# Patient Record
Sex: MALE | Race: WHITE
[De-identification: names, ages, dates, MRNs, and addresses within clinical notes are randomized per-mention and may not be internally consistent; named-entity substitution may affect disease eponyms.]

---

## 2023-11-21 ENCOUNTER — HOSPITAL ENCOUNTER (INPATIENT)
Age: 73
LOS: 2 days | Discharge: ANOTHER HOSPITAL | DRG: 682 | End: 2023-11-23
Payer: MEDICARE

## 2023-11-21 VITALS
RESPIRATION RATE: 20 BRPM | TEMPERATURE: 97.9 F | DIASTOLIC BLOOD PRESSURE: 113 MMHG | OXYGEN SATURATION: 95 % | HEART RATE: 84 BPM | SYSTOLIC BLOOD PRESSURE: 183 MMHG

## 2023-11-21 VITALS
HEART RATE: 83 BPM | DIASTOLIC BLOOD PRESSURE: 70 MMHG | RESPIRATION RATE: 16 BRPM | TEMPERATURE: 98.42 F | SYSTOLIC BLOOD PRESSURE: 114 MMHG | OXYGEN SATURATION: 97 %

## 2023-11-21 VITALS — BODY MASS INDEX: 34.7 KG/M2 | BODY MASS INDEX: 34.6 KG/M2

## 2023-11-21 VITALS
HEART RATE: 86 BPM | DIASTOLIC BLOOD PRESSURE: 78 MMHG | SYSTOLIC BLOOD PRESSURE: 146 MMHG | OXYGEN SATURATION: 93 % | TEMPERATURE: 97.7 F | RESPIRATION RATE: 17 BRPM

## 2023-11-21 VITALS — SYSTOLIC BLOOD PRESSURE: 167 MMHG | DIASTOLIC BLOOD PRESSURE: 97 MMHG | HEART RATE: 81 BPM

## 2023-11-21 DIAGNOSIS — I25.10: ICD-10-CM

## 2023-11-21 DIAGNOSIS — M19.90: ICD-10-CM

## 2023-11-21 DIAGNOSIS — E78.2: ICD-10-CM

## 2023-11-21 DIAGNOSIS — I50.21: ICD-10-CM

## 2023-11-21 DIAGNOSIS — N18.2: ICD-10-CM

## 2023-11-21 DIAGNOSIS — I13.10: ICD-10-CM

## 2023-11-21 DIAGNOSIS — E66.9: ICD-10-CM

## 2023-11-21 DIAGNOSIS — E83.42: ICD-10-CM

## 2023-11-21 DIAGNOSIS — I42.9: ICD-10-CM

## 2023-11-21 LAB
ALBUMIN LEVEL: 4.4 G/DL (ref 3.5–5)
ALBUMIN/GLOB SERPL: 1.5 {RATIO} (ref 1.1–1.8)
ALP ISO SERPL-ACNC: 95 U/L (ref 38–126)
ALT SERPLBLD-CCNC: 35 U/L (ref 12–78)
ANION GAP SERPL CALC-SCNC: 14.1 MEQ/L (ref 5–15)
AST SERPL QL: 53 U/L (ref 17–59)
BILIRUBIN,TOTAL: 1.3 MG/DL (ref 0.2–1.3)
BUN SERPL-MCNC: 28 MG/DL (ref 9–20)
CALCIUM SPEC-MCNC: 8.6 MG/DL (ref 8.4–10.2)
CHLORIDE SPEC-SCNC: 103 MMOL/L (ref 98–107)
CO2 SERPL-SCNC: 24 MMOL/L (ref 22–30)
CREAT BLD-SCNC: 1.4 MG/DL (ref 0.66–1.25)
CREATININE CLEARANCE ESTIMATED: 66 ML/MIN (ref 50–200)
ESTIMATED GLOMERULAR FILT RATE: 50 ML/MIN (ref 60–?)
GFR (AFRICAN AMERICAN): 60 ML/MIN (ref 60–?)
GLOBULIN SER CALC-MCNC: 3 G/DL (ref 1.3–3.2)
GLUCOSE: 111 MG/DL (ref 74–100)
HBA1C MFR BLD: 5.6 % (ref 4–6)
HCT VFR BLD CALC: 41.8 % (ref 42–52)
HGB BLD-MCNC: 14.5 G/DL (ref 14.1–18)
MAGNESIUM: 1.4 MG/DL (ref 1.6–2.3)
MCHC RBC-ENTMCNC: 34.6 G/DL (ref 31.8–35.4)
MCV RBC: 91.4 FL (ref 80–94)
MEAN CORPUSCULAR HEMOGLOBIN: 31.6 PG (ref 27–31.2)
NT PRO BRAIN NATRIURETIC PEP.: 5940 PG/ML (ref 0–125)
PLATELET # BLD: 209 K/MM3 (ref 142–424)
POTASSIUM: 3.1 MMOL/L (ref 3.5–5.1)
PROT SERPL-MCNC: 7.4 G/DL (ref 6.3–8.2)
RBC # BLD AUTO: 4.57 M/MM3 (ref 4.6–6.2)
SODIUM SPEC-SCNC: 138 MMOL/L (ref 136–145)
TROPONIN I: 0.03 NG/ML (ref 0–0.03)
TROPONIN I: 0.03 NG/ML (ref 0–0.03)
TSH SERPL-ACNC: 0.73 UIU/ML (ref 0.47–4.68)
WBC # BLD AUTO: 7.8 K/MM3 (ref 4.8–10.8)

## 2023-11-21 PROCEDURE — 85025 COMPLETE CBC W/AUTO DIFF WBC: CPT

## 2023-11-21 PROCEDURE — 80053 COMPREHEN METABOLIC PANEL: CPT

## 2023-11-21 PROCEDURE — C1769 GUIDE WIRE: HCPCS

## 2023-11-21 PROCEDURE — 83036 HEMOGLOBIN GLYCOSYLATED A1C: CPT

## 2023-11-21 PROCEDURE — 83735 ASSAY OF MAGNESIUM: CPT

## 2023-11-21 PROCEDURE — 93458 L HRT ARTERY/VENTRICLE ANGIO: CPT

## 2023-11-21 PROCEDURE — 80061 LIPID PANEL: CPT

## 2023-11-21 PROCEDURE — 93308 TTE F-UP OR LMTD: CPT

## 2023-11-21 PROCEDURE — 93005 ELECTROCARDIOGRAM TRACING: CPT

## 2023-11-21 PROCEDURE — 36415 COLL VENOUS BLD VENIPUNCTURE: CPT

## 2023-11-21 PROCEDURE — 99152 MOD SED SAME PHYS/QHP 5/>YRS: CPT

## 2023-11-21 PROCEDURE — C1725 CATH, TRANSLUMIN NON-LASER: HCPCS

## 2023-11-21 PROCEDURE — 84484 ASSAY OF TROPONIN QUANT: CPT

## 2023-11-21 PROCEDURE — 84443 ASSAY THYROID STIM HORMONE: CPT

## 2023-11-21 PROCEDURE — 93452 LEFT HRT CATH W/VENTRCLGRPHY: CPT

## 2023-11-21 PROCEDURE — 83880 ASSAY OF NATRIURETIC PEPTIDE: CPT

## 2023-11-21 NOTE — PC.NURSE
received report from basil at [facility], pt arrived by personal vehicle with wife. arrived to floor at 1610 via wheelchair

## 2023-11-22 VITALS
HEART RATE: 70 BPM | DIASTOLIC BLOOD PRESSURE: 84 MMHG | SYSTOLIC BLOOD PRESSURE: 111 MMHG | RESPIRATION RATE: 16 BRPM | TEMPERATURE: 98.5 F | OXYGEN SATURATION: 92 %

## 2023-11-22 VITALS
RESPIRATION RATE: 16 BRPM | SYSTOLIC BLOOD PRESSURE: 102 MMHG | TEMPERATURE: 98.5 F | HEART RATE: 77 BPM | OXYGEN SATURATION: 92 % | DIASTOLIC BLOOD PRESSURE: 63 MMHG

## 2023-11-22 VITALS
RESPIRATION RATE: 18 BRPM | HEART RATE: 73 BPM | SYSTOLIC BLOOD PRESSURE: 110 MMHG | OXYGEN SATURATION: 90 % | DIASTOLIC BLOOD PRESSURE: 78 MMHG

## 2023-11-22 VITALS
OXYGEN SATURATION: 96 % | SYSTOLIC BLOOD PRESSURE: 113 MMHG | DIASTOLIC BLOOD PRESSURE: 70 MMHG | HEART RATE: 78 BPM | TEMPERATURE: 98.42 F | RESPIRATION RATE: 16 BRPM

## 2023-11-22 VITALS
DIASTOLIC BLOOD PRESSURE: 78 MMHG | HEART RATE: 72 BPM | SYSTOLIC BLOOD PRESSURE: 137 MMHG | RESPIRATION RATE: 16 BRPM | OXYGEN SATURATION: 96 % | TEMPERATURE: 98.42 F

## 2023-11-22 VITALS
DIASTOLIC BLOOD PRESSURE: 66 MMHG | TEMPERATURE: 98.4 F | OXYGEN SATURATION: 95 % | HEART RATE: 83 BPM | RESPIRATION RATE: 16 BRPM | SYSTOLIC BLOOD PRESSURE: 130 MMHG

## 2023-11-22 VITALS
OXYGEN SATURATION: 97 % | HEART RATE: 83 BPM | TEMPERATURE: 98.42 F | RESPIRATION RATE: 16 BRPM | SYSTOLIC BLOOD PRESSURE: 114 MMHG | DIASTOLIC BLOOD PRESSURE: 70 MMHG

## 2023-11-22 VITALS
HEART RATE: 83 BPM | RESPIRATION RATE: 22 BRPM | DIASTOLIC BLOOD PRESSURE: 87 MMHG | SYSTOLIC BLOOD PRESSURE: 133 MMHG | TEMPERATURE: 98.2 F | OXYGEN SATURATION: 94 %

## 2023-11-22 VITALS
RESPIRATION RATE: 16 BRPM | DIASTOLIC BLOOD PRESSURE: 74 MMHG | HEART RATE: 68 BPM | TEMPERATURE: 97.88 F | OXYGEN SATURATION: 96 % | SYSTOLIC BLOOD PRESSURE: 136 MMHG

## 2023-11-22 VITALS
TEMPERATURE: 98.5 F | SYSTOLIC BLOOD PRESSURE: 118 MMHG | HEART RATE: 75 BPM | RESPIRATION RATE: 16 BRPM | OXYGEN SATURATION: 94 % | DIASTOLIC BLOOD PRESSURE: 77 MMHG

## 2023-11-22 VITALS
OXYGEN SATURATION: 96 % | HEART RATE: 85 BPM | RESPIRATION RATE: 16 BRPM | DIASTOLIC BLOOD PRESSURE: 75 MMHG | SYSTOLIC BLOOD PRESSURE: 134 MMHG

## 2023-11-22 VITALS
TEMPERATURE: 98.5 F | DIASTOLIC BLOOD PRESSURE: 90 MMHG | RESPIRATION RATE: 16 BRPM | SYSTOLIC BLOOD PRESSURE: 134 MMHG | HEART RATE: 80 BPM | OXYGEN SATURATION: 96 %

## 2023-11-22 VITALS
RESPIRATION RATE: 16 BRPM | HEART RATE: 83 BPM | TEMPERATURE: 98.5 F | SYSTOLIC BLOOD PRESSURE: 109 MMHG | DIASTOLIC BLOOD PRESSURE: 59 MMHG

## 2023-11-22 VITALS
HEART RATE: 78 BPM | OXYGEN SATURATION: 94 % | DIASTOLIC BLOOD PRESSURE: 109 MMHG | TEMPERATURE: 97.88 F | RESPIRATION RATE: 17 BRPM | SYSTOLIC BLOOD PRESSURE: 171 MMHG

## 2023-11-22 VITALS
SYSTOLIC BLOOD PRESSURE: 109 MMHG | OXYGEN SATURATION: 97 % | HEART RATE: 75 BPM | DIASTOLIC BLOOD PRESSURE: 66 MMHG | RESPIRATION RATE: 18 BRPM

## 2023-11-22 VITALS — HEART RATE: 78 BPM

## 2023-11-22 VITALS
HEART RATE: 77 BPM | SYSTOLIC BLOOD PRESSURE: 121 MMHG | RESPIRATION RATE: 16 BRPM | DIASTOLIC BLOOD PRESSURE: 74 MMHG | OXYGEN SATURATION: 95 %

## 2023-11-22 VITALS
RESPIRATION RATE: 18 BRPM | SYSTOLIC BLOOD PRESSURE: 108 MMHG | DIASTOLIC BLOOD PRESSURE: 72 MMHG | HEART RATE: 77 BPM | OXYGEN SATURATION: 95 %

## 2023-11-22 VITALS
DIASTOLIC BLOOD PRESSURE: 73 MMHG | HEART RATE: 85 BPM | SYSTOLIC BLOOD PRESSURE: 114 MMHG | RESPIRATION RATE: 16 BRPM | OXYGEN SATURATION: 97 %

## 2023-11-22 VITALS
HEART RATE: 83 BPM | OXYGEN SATURATION: 96 % | TEMPERATURE: 98.6 F | RESPIRATION RATE: 17 BRPM | SYSTOLIC BLOOD PRESSURE: 116 MMHG | DIASTOLIC BLOOD PRESSURE: 61 MMHG

## 2023-11-22 VITALS
HEART RATE: 85 BPM | TEMPERATURE: 97.6 F | SYSTOLIC BLOOD PRESSURE: 116 MMHG | DIASTOLIC BLOOD PRESSURE: 67 MMHG | RESPIRATION RATE: 18 BRPM | OXYGEN SATURATION: 95 %

## 2023-11-22 VITALS — HEART RATE: 80 BPM

## 2023-11-22 VITALS
RESPIRATION RATE: 20 BRPM | SYSTOLIC BLOOD PRESSURE: 113 MMHG | HEART RATE: 81 BPM | DIASTOLIC BLOOD PRESSURE: 64 MMHG | TEMPERATURE: 98.06 F | OXYGEN SATURATION: 93 %

## 2023-11-22 VITALS — OXYGEN SATURATION: 97 % | HEART RATE: 90 BPM

## 2023-11-22 VITALS — OXYGEN SATURATION: 96 % | HEART RATE: 86 BPM

## 2023-11-22 VITALS — HEART RATE: 70 BPM

## 2023-11-22 LAB
CHOLEST SPEC-SCNC: 183 MG/DL (ref 140–200)
HDLC SERPL-MCNC: 39 MG/DL (ref 40–60)
TRIGLYCERIDES: 118 MG/DL (ref 30–150)
TROPONIN I: 0.03 NG/ML (ref 0–0.03)

## 2023-11-22 PROCEDURE — 4A023N7 MEASUREMENT OF CARDIAC SAMPLING AND PRESSURE, LEFT HEART, PERCUTANEOUS APPROACH: ICD-10-PCS | Performed by: INTERNAL MEDICINE

## 2023-11-22 NOTE — PC.NURSE
pt has become confused through the night, around 1 am pt walked into the [facility] looking for the bathroom. re-directed pt back to room. pt reported he needed to use the bathroom. pt assisted to the bathroom and pt was trying to sit on toilet with

## 2023-11-22 NOTE — PC.NURSE
[facility] called and said they should have a bed available after shift change and a d/c they have a their facility.

## 2023-11-23 NOTE — PC.NURSE
[id number] HERE. REPORT GIVEN. PATIENT TRANSFERED TO [facility].

## 2023-12-05 ENCOUNTER — OUTSIDE FACILITY SERVICE (OUTPATIENT)
Dept: CARDIOLOGY | Facility: CLINIC | Age: 73
End: 2023-12-05
Payer: MEDICARE